# Patient Record
(demographics unavailable — no encounter records)

---

## 2025-05-29 NOTE — PHYSICAL EXAM
[Chaperoned Physical Exam] : A chaperone was present in the examining room during all aspects of the physical examination. [MA] : MA [Soft] : soft [Non-tender] : non-tender [Non-distended] : non-distended [No Mass] : no mass [Oriented x3] : oriented x3 [Examination Of The Breasts] : a normal appearance [No Masses] : no breast masses were palpable [Labia Majora] : normal [Labia Minora] : normal [Normal] : normal [Enlarged ___ wks] : enlarged [unfilled] ~Uweeks [Uterine Adnexae] : normal [Declined] : Patient declined rectal exam

## 2025-06-01 NOTE — HISTORY OF PRESENT ILLNESS
[FreeTextEntry1] : 36 yo  here for annual and Pos preg test pt was having reg menses prior to conception LMP 01/06/25 on no meds last pap 3 yrs ago

## 2025-06-01 NOTE — HISTORY OF PRESENT ILLNESS
[FreeTextEntry1] : 34 yo  here for annual and Pos preg test pt was having reg menses prior to conception LMP 01/06/25 on no meds last pap 3 yrs ago

## 2025-06-01 NOTE — PLAN
[FreeTextEntry1] : WWE pap sent sono today is c/w dates EDC by LMP 10/14/25 prenatal bloods drawn pt will consider doing level 2 declined NIPs n/v 4 wks

## 2025-06-01 NOTE — PROCEDURE
[Cervical Pap Smear] : cervical Pap smear [Liquid Base] : liquid base [Tolerated Well] : the patient tolerated the procedure well [No Complications] : there were no complications [Transabdominal OB Sonogram] : Transabdominal OB Sonogram [Intrauterine Pregnancy] : intrauterine pregnancy [Fetal Heart] : fetal heart present [FreeTextEntry1] : all biometry c/w 20w3d   bpm

## 2025-06-24 NOTE — HISTORY OF PRESENT ILLNESS
[FreeTextEntry1] : pt comes in for a follow up visit  was seen for routine level 2 sono today  was noted to have no fh  diagnosed with fetal demise  discussed options with pt offered D&e vs Cytotec iol discussed risk benefits of each procedure  pt wants to go for Cytotec iol    pt with hx of hypertension  pt reports having bp in the normal range at home  doesn't take any meds recombed Cardiology eval after recovers form fetal demise

## 2025-06-24 NOTE — PLAN
[FreeTextEntry1] : fetal demise options reviewed  pt wants Cytotec iol  will call back the office when she will come in to l&d

## 2025-07-23 NOTE — PLAN
[FreeTextEntry1] : 34 y/o p5 with normal exam stable await labs precautions folate white coat htn , bp at home stable, will continue to monitor f/u for annual

## 2025-07-23 NOTE — HISTORY OF PRESENT ILLNESS
[FreeTextEntry1] : 34 y/o [id number] s/o iol for 24 week iufd, here for f/u. no complaints  nsd x 5, largest 8-15 lbs, gdma 1 by last, stop x 1, iufd 24 wks  no med or surgical hx  nkda  dy neg